# Patient Record
Sex: FEMALE | Race: BLACK OR AFRICAN AMERICAN | NOT HISPANIC OR LATINO | URBAN - METROPOLITAN AREA
[De-identification: names, ages, dates, MRNs, and addresses within clinical notes are randomized per-mention and may not be internally consistent; named-entity substitution may affect disease eponyms.]

---

## 2017-03-09 PROCEDURE — 99283 EMERGENCY DEPT VISIT LOW MDM: CPT

## 2017-03-10 ENCOUNTER — HOSPITAL ENCOUNTER (EMERGENCY)
Facility: OTHER | Age: 62
Discharge: HOME | End: 2017-03-10
Attending: EMERGENCY MEDICINE
Payer: COMMERCIAL

## 2017-03-10 VITALS
RESPIRATION RATE: 20 BRPM | OXYGEN SATURATION: 99 % | DIASTOLIC BLOOD PRESSURE: 70 MMHG | WEIGHT: 192 LBS | TEMPERATURE: 98 F | SYSTOLIC BLOOD PRESSURE: 144 MMHG | HEART RATE: 75 BPM

## 2017-03-10 DIAGNOSIS — K21.9 GASTROESOPHAGEAL REFLUX DISEASE WITHOUT ESOPHAGITIS: Primary | ICD-10-CM

## 2017-03-10 PROCEDURE — 25000003 PHARM REV CODE 250: Performed by: EMERGENCY MEDICINE

## 2017-03-10 RX ORDER — FAMOTIDINE 20 MG/1
20 TABLET, FILM COATED ORAL 2 TIMES DAILY
Qty: 60 TABLET | Refills: 0 | Status: SHIPPED | OUTPATIENT
Start: 2017-03-10 | End: 2018-03-10

## 2017-03-10 RX ORDER — FAMOTIDINE 20 MG/1
20 TABLET, FILM COATED ORAL
Status: COMPLETED | OUTPATIENT
Start: 2017-03-10 | End: 2017-03-10

## 2017-03-10 RX ADMIN — LIDOCAINE HYDROCHLORIDE: 20 SOLUTION ORAL; TOPICAL at 02:03

## 2017-03-10 RX ADMIN — FAMOTIDINE 20 MG: 20 TABLET, FILM COATED ORAL at 02:03

## 2017-03-10 NOTE — ED PROVIDER NOTES
Encounter Date: 3/9/2017       History     Chief Complaint   Patient presents with    Abdominal Pain     since 3am. Pt reports compliance w reflux medications. Denies vomiting.      Review of patient's allergies indicates:  No Known Allergies  Patient is a 61 y.o. female presenting with the following complaint: abdominal pain.   Abdominal Pain   The current episode started today (3 AM). The onset of the illness was gradual. The problem has been gradually worsening. The abdominal pain is located in the epigastric region. The abdominal pain does not radiate. The other symptoms of the illness include nausea. The other symptoms of the illness do not include fever, shortness of breath, vomiting, diarrhea or dysuria.   Symptoms associated with the illness do not include chills or hematuria.     Past Medical History:   Diagnosis Date    GERD (gastroesophageal reflux disease)     IBS (irritable bowel syndrome)      Past Surgical History:   Procedure Laterality Date    CHOLECYSTECTOMY      HAND SURGERY      HYSTERECTOMY      KNEE SURGERY       Family History   Problem Relation Age of Onset    Breast cancer Neg Hx     Colon cancer Neg Hx     Ovarian cancer Neg Hx      Social History   Substance Use Topics    Smoking status: Never Smoker    Smokeless tobacco: Not on file    Alcohol use Not on file     Review of Systems   Constitutional: Negative for chills and fever.   HENT: Negative.    Eyes: Negative.    Respiratory: Negative for cough, shortness of breath and stridor.    Cardiovascular: Negative for chest pain and palpitations.   Gastrointestinal: Positive for abdominal pain and nausea. Negative for diarrhea and vomiting.   Genitourinary: Negative for dysuria and hematuria.   Musculoskeletal: Negative.    Skin: Negative.    Neurological: Negative for dizziness and headaches.   Psychiatric/Behavioral: Negative.    All other systems reviewed and are negative.      Physical Exam   Initial Vitals   BP Pulse Resp  Temp SpO2   03/10/17 0011 03/10/17 0011 03/10/17 0011 03/10/17 0011 03/10/17 0011   152/98 77 18 98 °F (36.7 °C) 98 %     Physical Exam    Nursing note and vitals reviewed.  Constitutional: She appears well-developed and well-nourished. She is not diaphoretic. No distress.   HENT:   Head: Normocephalic and atraumatic.   Mouth/Throat: Oropharynx is clear and moist. No oropharyngeal exudate.   Eyes: EOM are normal. Pupils are equal, round, and reactive to light.   Neck: Normal range of motion. Neck supple.   Cardiovascular: Normal rate, regular rhythm and intact distal pulses.   No murmur heard.  Pulmonary/Chest: Breath sounds normal. No stridor. No respiratory distress.   Abdominal: Soft. Bowel sounds are normal. There is no tenderness.   Musculoskeletal: Normal range of motion. She exhibits no edema or tenderness.   Neurological: She is alert and oriented to person, place, and time. She has normal strength. No cranial nerve deficit.   Skin: Skin is warm and dry. No erythema. No pallor.   Psychiatric: She has a normal mood and affect.         ED Course   Procedures  Labs Reviewed - No data to display          Medical Decision Making:   ED Management:  Symptoms resolved with GI cocktail.                    ED Course     Imaging Reviewed    Imaging Results     None          Medications given in ED    Medications   famotidine tablet 20 mg (20 mg Oral Given 3/10/17 0211)   (pyxis) gi cocktail (mylanta 30 mL, lidocaine 2 % viscous 10 mL, dicyclomine 10 mL) 50 mL ( Oral Given 3/10/17 0211)       Discharge Medications     Discharge Medication List as of 3/10/2017  3:00 AM      START taking these medications    Details   famotidine (PEPCID) 20 MG tablet Take 1 tablet (20 mg total) by mouth 2 (two) times daily., Starting 3/10/2017, Until Sat 3/10/18, Normal         CONTINUE these medications which have NOT CHANGED    Details   levocetirizine (XYZAL) 5 MG tablet Starting 6/1/2016, Until Discontinued, Historical Med       LINZESS 145 mcg Cap capsule Starting 7/1/2016, Until Discontinued, Historical Med      lovastatin (MEVACOR) 20 MG tablet Starting 6/1/2016, Until Discontinued, Historical Med      mupirocin (BACTROBAN) 2 % ointment Starting 6/3/2016, Until Discontinued, Historical Med      omeprazole (PRILOSEC) 40 MG capsule Starting 6/1/2016, Until Discontinued, Historical Med      PREMARIN vaginal cream Starting 6/1/2016, Until Discontinued, Historical Med      triamcinolone acetonide 0.1% (KENALOG) 0.1 % cream Starting 5/26/2016, Until Discontinued, Historical Med      VITAMIN D2 50,000 unit capsule Starting 6/1/2016, Until Discontinued, Historical Med                Patient discharged to home in stable condition with instructions to:   1. Please take all meds as prescribed.  2. Follow-up with your primary care doctor   3. Return precautions discussed and patient and/or family/caretaker understands to return to the emergency room for any concerns including worsening of your current symptoms, fever, chills, night sweats, worsening pain, chest pain, shortness of breath, nausea, vomiting, diarrhea, bleeding, headache, difficulty talking, visual disturbances, weakness, numbness or any other acute concerns    Clinical Impression:   The encounter diagnosis was Gastroesophageal reflux disease without esophagitis.          Vinod Guzman MD  03/10/17 0643

## 2017-03-10 NOTE — ED NOTES
"Appearance: Pt awake, alert & oriented to person, place & time. Pt in no acute distress at present time.  Skin: Skin warm, dry & intact. Mucous membranes moist. Skin turgor normal.  Respiratory: Respirations even, non-labored.   CV: right sided cp, "feels like reflux" states non radiating and can feel a sour taste in the back of her mouth.   GI: soft non tender, denies n/v/d  Neurologic: Pt moving all extremities without difficulty. Sensation intact.   Peripheral Vascular: All peripheral pulses present.    "

## 2017-03-10 NOTE — DISCHARGE INSTRUCTIONS
Tips to Control Acid Reflux    To control acid reflux, youll need to make some basic diet and lifestyle changes. The simple steps outlined below may be all youll need to ease discomfort.  Watch what you eat  · Avoid fatty foods and spicy foods.  · Eat fewer acidic foods, such as citrus and tomato-based foods. These can increase symptoms.  · Limit drinking alcohol, caffeine, and fizzy beverages. All increase acid reflux.  · Try limiting chocolate, peppermint, and spearmint. These can worsen acid reflux in some people.  Watch when you eat  · Avoid lying down for 3 hours after eating.  · Do not snack before going to bed.  Raise your head  Raising your head and upper body by 4 to 6 inches helps limit reflux when youre lying down. Put blocks under the head of your bed frame to raise it.  Other changes  · Lose weight, if you need to  · Dont exercise near bedtime  · Avoid tight-fitting clothes  · Limit aspirin and ibuprofen  · Stop smoking   Date Last Reviewed: 7/1/2016  © 3737-5032 [employer] [address] [place] [address], [place] [address]. All rights reserved. This information is not intended as a substitute for professional medical care. Always follow your healthcare professional's instructions.

## 2017-03-10 NOTE — ED AVS SNAPSHOT
[facility] EMERGENCY DEPARTMENT  [address] [facility]  [place] [address]               Leigh Pereira   3/10/2017  1:04 AM   ED    Description:  Female : 1955   Department:  [facility] Emergency Department           Your Care was Coordinated By:     Provider Role From To    Vinod Guzman MD Attending Provider 03/10/17 0107 --      Reason for Visit     Abdominal Pain           Diagnoses this Visit        Comments    Gastroesophageal reflux disease without esophagitis    -  Primary       ED Disposition     ED Disposition Condition Comment    Discharge  Patient discharged to home in stable condition.              To Do List           Follow-up Information     Follow up with Your GI doctor In 1 week.    Why:  for re-evaluation of today's complaint, and ongoing care       These Medications        Disp Refills Start End    famotidine (PEPCID) 20 MG tablet 60 tablet 0 3/10/2017 3/10/2018    Take 1 tablet (20 mg total) by mouth 2 (two) times daily. - Oral    Pharmacy: [facility] [address] [place] [address] #: [phone]         [facility] On Call     [facility] On Call Nurse Care Line -  Assistance  Registered nurses in the [facility] On Call Center provide clinical advisement, health education, appointment booking, and other advisory services.  Call for this free service at [phone].             Medications           START taking these NEW medications        Refills    famotidine (PEPCID) 20 MG tablet 0    Sig: Take 1 tablet (20 mg total) by mouth 2 (two) times daily.    Class: Normal    Route: Oral      These medications were administered today        Dose Freq    famotidine tablet 20 mg 20 mg ED 1 Time    Sig: Take 1 tablet (20 mg total) by mouth ED 1 Time.    Class: Normal    Route: Oral    (pyxis) gi cocktail (mylanta 30 mL, lidocaine 2 % viscous 10 mL, dicyclomine 10 mL) 50 mL  ED 1 Time    Sig: Take by mouth ED 1 Time.    Class: Normal    Route: Oral            Verify that the below list of medications is an accurate representation of the medications you are currently taking.  If none reported, the list may be blank. If incorrect, please contact your healthcare provider. Carry this list with you in case of emergency.           Current Medications     famotidine (PEPCID) 20 MG tablet Take 1 tablet (20 mg total) by mouth 2 (two) times daily.    famotidine tablet 20 mg Take 1 tablet (20 mg total) by mouth ED 1 Time.    levocetirizine (XYZAL) 5 MG tablet     LINZESS 145 mcg Cap capsule     lovastatin (MEVACOR) 20 MG tablet     mupirocin (BACTROBAN) 2 % ointment     omeprazole (PRILOSEC) 40 MG capsule     PREMARIN vaginal cream     triamcinolone acetonide 0.1% (KENALOG) 0.1 % cream     VITAMIN D2 50,000 unit capsule            Clinical Reference Information           Your Vitals Were     BP Pulse Temp Resp Weight SpO2    152/98 77 98 °F (36.7 °C) 18 87.1 kg (192 lb) 98%      Allergies as of 3/10/2017     No Known Allergies      Immunizations Administered on Date of Encounter - 3/10/2017     None      ED Micro, Lab, POCT     None      ED Imaging Orders     None        Discharge Instructions         Tips to Control Acid Reflux    To control acid reflux, youll need to make some basic diet and lifestyle changes. The simple steps outlined below may be all youll need to ease discomfort.  Watch what you eat  · Avoid fatty foods and spicy foods.  · Eat fewer acidic foods, such as citrus and tomato-based foods. These can increase symptoms.  · Limit drinking alcohol, caffeine, and fizzy beverages. All increase acid reflux.  · Try limiting chocolate, peppermint, and spearmint. These can worsen acid reflux in some people.  Watch when you eat  · Avoid lying down for 3 hours after eating.  · Do not snack before going to bed.  Raise your head  Raising your head and upper body by 4 to 6 inches helps limit reflux when youre lying down. Put blocks under the head of your bed frame to raise  it.  Other changes  · Lose weight, if you need to  · Dont exercise near bedtime  · Avoid tight-fitting clothes  · Limit aspirin and ibuprofen  · Stop smoking   Date Last Reviewed: 7/1/2016  © 4080-4008 [employer]. [address] [place] [address], [place] [address]. All rights reserved. This information is not intended as a substitute for professional medical care. Always follow your healthcare professional's instructions.          [facility] Sign-Up     Activating your [facility] account is as easy as 1-2-3!     1) Visit [email or web address], select Sign Up Now, enter this activation code and your date of birth, then select Next.  [email or web address]  Expires: 4/24/2017  3:00 AM      2) Create a username and password to use when you visit [facility] in the future and select a security question in case you lose your password and select Next.    3) Enter your e-mail address and click Sign Up!    Additional Information  If you have questions, please e-mail [email or web address] or call [phone] to talk to our [facility] staff. Remember, [facility] is NOT to be used for urgent needs. For medical emergencies, dial 911.          [facility] Emergency Department complies with applicable Federal civil rights laws and does not discriminate on the basis of race, color, national origin, age, disability, or sex.        Language Assistance Services     ATTENTION: Language assistance services are available, free of charge. Please call [phone].      ATENCIÓN: Si habla español, tiene a kaplan disposición servicios gratuitos de asistencia lingüística. Llame al [phone].     CHÚ Ý: N?u b?n nói Ti?ng Vi?t, có các d?ch v? h? tr? ngôn ng? mi?n phí dành cho b?n. G?i s? [phone].

## 2019-08-29 PROBLEM — E78.5 HLD (HYPERLIPIDEMIA): Status: ACTIVE | Noted: 2018-10-25

## 2019-08-29 PROBLEM — K59.09 CHRONIC CONSTIPATION: Status: ACTIVE | Noted: 2019-05-06

## 2019-08-29 PROBLEM — K21.9 GERD WITHOUT ESOPHAGITIS: Status: ACTIVE | Noted: 2018-10-25

## 2019-08-29 PROBLEM — E55.9 VITAMIN D DEFICIENCY: Status: ACTIVE | Noted: 2019-08-29

## 2019-08-29 PROBLEM — N13.30 BILATERAL HYDRONEPHROSIS: Status: ACTIVE | Noted: 2019-08-29

## 2019-08-29 PROBLEM — M54.12 CERVICAL RADICULOPATHY: Status: ACTIVE | Noted: 2019-08-29
